# Patient Record
Sex: FEMALE | Employment: FULL TIME | URBAN - METROPOLITAN AREA
[De-identification: names, ages, dates, MRNs, and addresses within clinical notes are randomized per-mention and may not be internally consistent; named-entity substitution may affect disease eponyms.]

---

## 2024-08-29 ENCOUNTER — DOCUMENTATION (OUTPATIENT)
Dept: SURGERY | Facility: CLINIC | Age: 64
End: 2024-08-29

## 2024-08-29 NOTE — PROGRESS NOTES
Initial bariatric nurse interview completed. Discussed with patient program progression/expectations along with program requirements and necessary clearances. Explained to patient they must travel with support person to all visits. Went over initial evaluation visit: labwork, appointment with Dr. Torres, appointment with dietician, and appointment with psych. Explained patient will be in area for approximately 2 days for evaluation visit and 9 days surrounding WLS at a local hotel. Patient aware of the $6850 Total OOP maximum, $930 for Initial evaluation visit if OOP not met. Any amount owed must be paid within 10 business days of visits. Patient verbalized understanding to all. All questions answered. Scheduled initial visit for 9/23/2024. Emailed New Patient Handbook including assessment forms & contracts.

## 2024-09-03 ENCOUNTER — TELEPHONE (OUTPATIENT)
Dept: SURGERY | Facility: CLINIC | Age: 64
End: 2024-09-03
Payer: COMMERCIAL

## 2024-09-03 PROBLEM — Z98.84 BARIATRIC SURGERY STATUS: Status: ACTIVE | Noted: 2024-09-03

## 2024-09-03 PROBLEM — Z01.818 PREOPERATIVE CLEARANCE: Status: ACTIVE | Noted: 2024-09-03

## 2024-09-03 PROBLEM — E66.01 MORBID OBESITY (MULTI): Status: ACTIVE | Noted: 2024-09-03

## 2024-09-03 RX ORDER — OXYBUTYNIN CHLORIDE 15 MG/1
15 TABLET, EXTENDED RELEASE ORAL DAILY
COMMUNITY

## 2024-09-03 RX ORDER — FOLIC ACID 1 MG/1
1 TABLET ORAL DAILY
COMMUNITY

## 2024-09-03 RX ORDER — SERTRALINE HYDROCHLORIDE 100 MG/1
100 TABLET, FILM COATED ORAL DAILY
COMMUNITY

## 2024-09-03 RX ORDER — NAPROXEN 500 MG/1
500 TABLET ORAL
COMMUNITY

## 2024-09-03 RX ORDER — BUPROPION HYDROCHLORIDE 300 MG/1
300 TABLET ORAL DAILY
COMMUNITY

## 2024-09-03 RX ORDER — LISINOPRIL 10 MG/1
10 TABLET ORAL DAILY
COMMUNITY

## 2024-09-03 RX ORDER — NAPROXEN SODIUM 220 MG/1
1200 TABLET ORAL DAILY
COMMUNITY

## 2024-09-03 RX ORDER — CHOLECALCIFEROL (VITAMIN D3) 50 MCG
50 TABLET ORAL DAILY
COMMUNITY

## 2024-09-03 RX ORDER — TIZANIDINE HYDROCHLORIDE 2 MG/1
2 CAPSULE, GELATIN COATED ORAL AS NEEDED
COMMUNITY

## 2024-09-03 NOTE — TELEPHONE ENCOUNTER
Spoke with patient: reviewed intake forms, discussed upcoming appointment, and given nurse contact number for any further questions.

## 2024-09-03 NOTE — PROGRESS NOTES
Subjective   Date: 9/23/2024 Time: 9:03 AM  Name: Sarah Villasenor  MRN: [id number]    This is a 64 y.o. female with morbid obesity, BMI ~36 who presents to clinic for consideration of bariatric surgery. she has attempted and failed multiple diet and exercise regimens for weight loss. Initial Onset of obesity was in childhood.  Their goal for surgery is to  be healthier  and lose weight. The patient has tried multiple diets to lose weight including Rodriguez, Morris, Weight Watchers, Medications , and dietary drinks, low calorie . The patient was most successful with the low fat diet. The most pounds lost on this diet were 20 lbs. The patient considers their dietary weakness to be  carbs, sweets, eating out, late night snacking, pop/soda  The patient reports a  highest weight ever of 230 pounds and lowest weight ever of 200 poundsThe patient does not exercise    Here with her sister who had rygbp 20 years ago and she is doing well.  Patient notes she is sedentary and and broke arm in may.  Does enjoy sweets.  She has cut back on pop.  Has dessert 1-2 days/week  PREFERRED SURGICAL PROCEDURE: Laparoscopic Kali-en-Y Gastric Bypass: sister had RYGBP 10 years ago with great success    Comorbidities: anxiety, back paintakes NSAIDS, depressed moodtakes medications, sees a therapist/psychiatrist, and previously hospitalized for mental health, infertility, urinary incontinence, sleep apnea using an appliance, and hypertension controlled with oral meds  Back pain corrected with surgery and does not use a lot of nsaids, can walk a half mile and then gets back pain  Uses naproxyn daily, getsback pain without it  Menstrual History : Menopause age was UNK years old    PMH:   Past Medical History:   Diagnosis Date    Anoxic brain injury (Multi) 2012    Anxiety and depression     Back pain     Decreased exercise tolerance     Decreased range of motion     Hypertension     Infertility, female     JOSE on CPAP     Urinary incontinence      She overdosed on oxycontin in 2012 and had anoxic brain injury-notes accidental    PSH:   Past Surgical History:   Procedure Laterality Date    BACK SURGERY  10/09/2009    lL4, L5    CARPAL TUNNEL RELEASE Bilateral     COLONOSCOPY  2011    FRACTURE SURGERY  06/12/2024    Left arm fracture D/T fall    TONSILLECTOMY  11/05/1990        NONE:  personal/family hx of VTE.        GERD - Health Related Quality of Life Questionnaire (GERD- HRQL): Denies GERD    Off PPI for has never taken medications (how long)    How bad is the heartburn? 0 = No symptoms  Heartburn when lying down? 0 = No symptoms  Heartburn when standing up? 0 = No symptoms  Heartburn after meals? 0 = No symptoms  Does heartburn change your diet? 0 = No symptoms  Does heartburn wake you from sleep? 0 = No symptoms    Do you have difficulty swallowing? 0 = No symptoms  Do you have pain with swallowing? 0 = No symptoms  If you take medication, does this affect your daily life? 0 = No symptoms    How bad is the regurgitation? 0 = No symptoms  Regurgitation when lying down? 0 = No symptoms  Regurgitation when standing up? 0 = No symptoms  Regurgitation after meals? 0 = No symptoms  Does regurgitation change your diet? 0 = No symptoms  Does regurgitation wake you from sleep? 0 = No symptoms    How satisfied are you with your present condition? Neutral    Total score (calculated by summing the individual scores of questions 1-15): 0   Greatest possible score 75 (worst symptoms).   Lowest possible score 0 (no symptoms).    Heartburn score (calculated by summing the individual scores of questions 1-6): 0   Worst heartburn symptoms: 30.   No heartburn symptoms: 0.   Score less than or equal to 12 with each individual question not exceeding 2 indicate heartburn elimination.    Regurgitation score (calculated by summing the individual scores of questions 10-15): 0   Worst regurgitation symptoms: 30.   No regurgitation symptoms: 0.   Score less than or equal to 12  with each individual question not exceeding 2 indicate regurgitation elimination.               FAMILY HISTORY:  Family History   Problem Relation Name Age of Onset    Heart disease Father      Heart attack Father      Diabetes Father      Hypertension Father      Obesity in sister    SOCIAL HISTORY:  Social History     Tobacco Use    Smoking status: Never    Smokeless tobacco: Never   Substance Use Topics    Alcohol use: Not Currently    Drug use: Never       MEDICATIONS:  Prior to Admission Medications:  Medication Documentation Review Audit       Reviewed by Tory Torres MD MPH (Physician) on 09/23/24 at 0853      Medication Order Taking? Sig Documenting Provider Last Dose Status   buPROPion XL (Wellbutrin XL) 300 mg 24 hr tablet [id number]  Take 1 tablet (300 mg) by mouth once daily. Do not crush, chew, or split. Historical Provider, MD  Active   calcium phosphate trib/vit D3 (CALTRATE GUMMY BITES ORAL) [id number]  Take 650 mg by mouth once daily. Historical Provider, MD  Active   cholecalciferol (Vitamin D3) 50 MCG (2000 UT) tablet [id number]  Take 1 tablet (50 mcg) by mouth once daily. Historical Provider, MD  Active   folic acid (Folvite) 1 mg tablet [id number]  Take 1 tablet (1 mg) by mouth once daily. Historical Provider, MD  Active   glucosamine HCl (GLUCOSAMINE, BULK, MISC) [id number]  1,100 mg once daily. Historical Provider, MD  Active   lisinopril 10 mg tablet [id number]  Take 1 tablet (10 mg) by mouth once daily. Historical Provider, MD  Active   naproxen (Naprosyn) 500 mg tablet [id number]  Take 1 tablet (500 mg) by mouth 2 times daily (morning and late afternoon). Takes 1-2 times daily as needed Historical Provider, MD  Active   omega 3-dha-epa-fish oil (Fish OiL) 1,200 (144-216) mg capsule [id number]  Take 1 capsule (1,200 mg) by mouth once daily. Historical Provider, MD  Active   oxybutynin XL (Ditropan-XL) 15 mg 24 hr tablet [id number]  Take 1 tablet (15 mg) by mouth once daily. Do not crush, chew, or  "split. Historical Provider, MD  Active   sertraline (Zoloft) 100 mg tablet [id number]  Take 1 tablet (100 mg) by mouth once daily. Historical Provider, MD  Active   tiZANidine (Zanaflex) 2 mg capsule [id number]  Take 1 capsule (2 mg) by mouth if needed for muscle spasms. Historical Provider, MD  Active                     ALLERGIES:  Allergies   Allergen Reactions    Erythromycin Unknown   Hives with erythromycin    REVIEW OF SYSTEMS:  GENERAL: Negative for malaise, significant weight loss and fever  HEAD: Negative for headache, swelling.  NECK: Negative for lumps, goiter, pain and significant neck swelling  RESPIRATORY: Negative for cough, wheezing or shortness of breath.  CARDIOVASCULAR: Negative for chest pain, leg swelling or palpitations.  GI: Negative for abdominal discomfort, blood in stools or black stools or change in bowel habits  : No history of dysuria, frequency or incontinence  MUSCULOSKELETAL: Negative for joint pain or swelling, back pain or muscle pain.  SKIN: Negative for lesions, rash, and itching.  PSYCH: Negative for sleep disturbance, mood disorder and recent psychosocial stressors.  ENDOCRINE: Negative for cold or heat intolerance, polyuria, polydipsia and goiter.    Objective   PHYSICAL EXAM:  Visit Vitals  /80   Pulse 75   Ht 1.575 m (5' 2\")   Wt 98.4 kg (217 lb)   BMI 39.69 kg/m²   Smoking Status Never   BSA 2.07 m²   Body mass index is 39.69 kg/m².   General appearance: obese, NAD, Seems slightly cognitively slow, walks with a cane and notes has issues with balance  Neuro: AOx3  Head: EOMI; no swelling or lesions of scalp or face  ENT:  no lumps or lymphadenopathy, thyroid normal to palpation; oropharynx clear, no swelling or erythema  Skin: warm, no erythema or rashes  Lungs: clear to percussion and auscultation  Heart: regular rhythm and S1, S2 normal  Abdomen: soft, non-tender, no masses, no organomegaly  Extremities: Normal exam of the extremities. No swelling or pain.  Psych: no " hurried speech, no flight of ideas, normal affect    IMPRESSION:  Sarah Villasenor is a 64 y.o. female with a bmi of Body mass index is 39.69 kg/m². with the following diagnoses and co-morbidities: anxiety, back paintakes NSAIDS, depressed moodtakes medications, sees a therapist/psychiatrist, and previously hospitalized for mental health, infertility, urinary incontinence, sleep apnea using an appliance, and hypertension controlled with oral meds.  We discussed several factors comparing bypass versus sleeve. She feels she does not want sleeve to go bad. I reviewed with her the ability to keep the nsaid's on board will be better with a sleeve.  She also is over 61 yo and I feel for her and her slightly slower cognitive state due to her anoxic brain injury.  I also discussed lifestyle changes to make this successful and her movement is limited by her back pain.  She is still insistent with the bypass despite these suggestions I have made to her.  I have emphasized the improtance of making lifestyle changes in preparation for surgery and improving her movement to at least 5K steps prior to surgery.hip pain, urinary incontinence, depression, htn and all can be improved with weight loss.       We discussed the sleeve and b ypass  at [facility] and all questions were answered. risks and benefits were discussed  The patient understands the risks and benefits of the procedure and how the procedure is performed. The patient understands the risks include but are not limited to bleeding, infection, DVT, PE, pneumonia, myocardial infarction, leak along the staple lines, and weight regain. We discussed lifestyle changes necessary to be successful.      Her risk of complication is 1-2%.   She notes she is falling and has a balance problem.  She notes she can do chair exercises, I suggested a stationary or recumbent bike    This patient does meet the criteria for a surgical weight loss procedure according to NIH guidelines.  The risks of  sleeve gastrectomy, Kali-en-Y gastric bypass, and duodenal switch surgery including bleeding, leak, wound infection, dehydration, ulcers, internal hernia, DVT/PE, prolonged nausea/vomiting, incomplete resolution of associated medical conditions, reflux, weight regain, vitamin/mineral deficiencies, and death have been explained to the patient and Sarah Villasenor has expressed understanding and acceptance of them.     The increased risk of substance and alcohol abuse following bariatric surgery was discussed with the patient, along with the negative consequences of substance/alcohol use after surgery including addiction, worsening of mental health disorders, and injury to the stomach. The risk of smoking and vaping (tobacco or any other substance) after bariatric surgery was explained to the patient. This includes risk of anastamotic ulcers, gastritis, bleeding, perforation, stricture, and PO intolerance.  The patient expressed understanding and acceptance of these risks.    The benefits of the above surgeries including weight loss, improvement/resolution of associated medical and mental health conditions, improved mobility, and decreased mortality have been explained the the patient and Sarah Villasenor has expressed understanding and acceptance of them.      Assessment/Plan   PLAN:  The plan of treatment for Sarah Villaseonr is to continue with the consultations and tests ordered today in hopes of qualifying for pre-operative clearance for bariatric surgery. This includes:  Increasing movement and get 1 hour of exercise/day  No sweets except once/week.  No rice, breads or pasta  Eat 3 meals with a protein and veggie daily  Consult Nutrition for education   Consult Psychology  Consult Cardiology  Consult Sleep Medicine - concern for JOSE - Currently using a CPAP  Labs completed today  Esophagram  Recommend at least 10 lbs of weight loss prior to surgery.  Additional consults/testing: none    PLANNED PROCEDURE: Laparoscopic  Laparoscopic Sleeve Gastrectomy and Laparoscopic Kali en y Gastric Bypass      The following are some lifestyle changes you should begin to prepare you for your bypass surgery.   Eliminate soda and other carbonated beverages from your diet. Carbonation will not be well tolerated after surgery. Try Propel, Vitamin Water Zero, Sobe Lifewater, Crystal Light or water.    Increase fluid consumption to 64 oz daily. Do not drink within 30 minutes of eating as this will liquefy your food and make you hungry more quickly.    Exercise for 30-60 minutes daily. Brisk walking, bike riding and swimming are all examples of healthy exercise. If you are unable to exercise we recommend seated exercise.    Do not skip meals.    Take a multivitamin daily.    Lose weight. In preparation for your surgery it is important that you begin making healthier food choices now. Our dietitian will meet with you to help you select foods lower in calories and higher in nutrition. We would like you to lose at least 10  lbs prior to surgery.     Increase your protein intake to 60 grams per day.    Alcohol is empty calories. Please eliminate while preparing for surgery.    Plan your meals.      General Instruction: 1) Use the information we gave you today to work through your insurance requirements and medical clearances.   2) These documents need to get faxed to the program navigators so they can submit them for approval from your insurance company.   3) Obtain labs today at a  facility. We will call you with any abnormalities and corrections you need to make.   4) Continue to work with your primary care doctor and other specialist so your other health problems are well controlled prior to your surgery.   5) Adopt the recommendations of the program dietician so you develop healthy eating patterns.   6) Work with the sleep team to get your sleep apnea treated to prevent other health problems .   7) Consider attending a support group to  learn from other who have been through the process.   8) Come to the [facility] sessions.   45 minutes were spent with patient including history, physical exam, and education.

## 2024-09-23 ENCOUNTER — LAB (OUTPATIENT)
Dept: LAB | Facility: LAB | Age: 64
End: 2024-09-23
Payer: COMMERCIAL

## 2024-09-23 ENCOUNTER — APPOINTMENT (OUTPATIENT)
Dept: SURGERY | Facility: CLINIC | Age: 64
End: 2024-09-23
Payer: COMMERCIAL

## 2024-09-23 ENCOUNTER — OFFICE VISIT (OUTPATIENT)
Dept: BEHAVIORAL HEALTH | Facility: HOSPITAL | Age: 64
End: 2024-09-23
Payer: COMMERCIAL

## 2024-09-23 ENCOUNTER — NUTRITION (OUTPATIENT)
Dept: SURGERY | Facility: HOSPITAL | Age: 64
End: 2024-09-23
Payer: COMMERCIAL

## 2024-09-23 VITALS
SYSTOLIC BLOOD PRESSURE: 143 MMHG | DIASTOLIC BLOOD PRESSURE: 80 MMHG | BODY MASS INDEX: 39.93 KG/M2 | HEART RATE: 75 BPM | HEIGHT: 62 IN | WEIGHT: 217 LBS

## 2024-09-23 DIAGNOSIS — I10 HYPERTENSION, UNSPECIFIED TYPE: ICD-10-CM

## 2024-09-23 DIAGNOSIS — E66.01 MORBID OBESITY (MULTI): ICD-10-CM

## 2024-09-23 DIAGNOSIS — F33.0 MILD EPISODE OF RECURRENT MAJOR DEPRESSIVE DISORDER (CMS-HCC): ICD-10-CM

## 2024-09-23 DIAGNOSIS — Z98.84 BARIATRIC SURGERY STATUS: Primary | ICD-10-CM

## 2024-09-23 DIAGNOSIS — Z01.818 PREOPERATIVE CLEARANCE: ICD-10-CM

## 2024-09-23 DIAGNOSIS — Z98.84 BARIATRIC SURGERY STATUS: ICD-10-CM

## 2024-09-23 LAB
25(OH)D3 SERPL-MCNC: 39 NG/ML (ref 30–100)
ALBUMIN SERPL BCP-MCNC: 4.2 G/DL (ref 3.4–5)
ALP SERPL-CCNC: 69 U/L (ref 33–136)
ALT SERPL W P-5'-P-CCNC: 10 U/L (ref 7–45)
AMPHETAMINES UR QL SCN: NORMAL
ANION GAP SERPL CALC-SCNC: 11 MMOL/L (ref 10–20)
APTT PPP: 32 SECONDS (ref 27–38)
AST SERPL W P-5'-P-CCNC: 22 U/L (ref 9–39)
BARBITURATES UR QL SCN: NORMAL
BASOPHILS # BLD AUTO: 0.05 X10*3/UL (ref 0–0.1)
BASOPHILS NFR BLD AUTO: 0.7 %
BENZODIAZ UR QL SCN: NORMAL
BILIRUB SERPL-MCNC: 0.7 MG/DL (ref 0–1.2)
BUN SERPL-MCNC: 22 MG/DL (ref 6–23)
BZE UR QL SCN: NORMAL
CALCIUM SERPL-MCNC: 9.2 MG/DL (ref 8.6–10.3)
CANNABINOIDS UR QL SCN: NORMAL
CHLORIDE SERPL-SCNC: 100 MMOL/L (ref 98–107)
CHOLEST SERPL-MCNC: 236 MG/DL (ref 0–199)
CHOLESTEROL/HDL RATIO: 4.6
CO2 SERPL-SCNC: 31 MMOL/L (ref 21–32)
CREAT SERPL-MCNC: 0.77 MG/DL (ref 0.5–1.05)
EGFRCR SERPLBLD CKD-EPI 2021: 87 ML/MIN/1.73M*2
EOSINOPHIL # BLD AUTO: 0.18 X10*3/UL (ref 0–0.7)
EOSINOPHIL NFR BLD AUTO: 2.6 %
ERYTHROCYTE [DISTWIDTH] IN BLOOD BY AUTOMATED COUNT: 13.8 % (ref 11.5–14.5)
EST. AVERAGE GLUCOSE BLD GHB EST-MCNC: 114 MG/DL
FENTANYL+NORFENTANYL UR QL SCN: NORMAL
FERRITIN SERPL-MCNC: 33 NG/ML (ref 8–150)
FOLATE SERPL-MCNC: >24 NG/ML
GLUCOSE SERPL-MCNC: 103 MG/DL (ref 74–99)
HBA1C MFR BLD: 5.6 %
HCT VFR BLD AUTO: 45.9 % (ref 36–46)
HDLC SERPL-MCNC: 50.8 MG/DL
HGB BLD-MCNC: 14.2 G/DL (ref 12–16)
IMM GRANULOCYTES # BLD AUTO: 0.03 X10*3/UL (ref 0–0.7)
IMM GRANULOCYTES NFR BLD AUTO: 0.4 % (ref 0–0.9)
INR PPP: 1 (ref 0.9–1.1)
IRON SATN MFR SERPL: 33 % (ref 25–45)
IRON SERPL-MCNC: 116 UG/DL (ref 35–150)
LDLC SERPL CALC-MCNC: 164 MG/DL
LYMPHOCYTES # BLD AUTO: 1.55 X10*3/UL (ref 1.2–4.8)
LYMPHOCYTES NFR BLD AUTO: 22.8 %
MCH RBC QN AUTO: 28.1 PG (ref 26–34)
MCHC RBC AUTO-ENTMCNC: 30.9 G/DL (ref 32–36)
MCV RBC AUTO: 91 FL (ref 80–100)
METHADONE UR QL SCN: NORMAL
MONOCYTES # BLD AUTO: 0.38 X10*3/UL (ref 0.1–1)
MONOCYTES NFR BLD AUTO: 5.6 %
NEUTROPHILS # BLD AUTO: 4.62 X10*3/UL (ref 1.2–7.7)
NEUTROPHILS NFR BLD AUTO: 67.9 %
NON HDL CHOLESTEROL: 185 MG/DL (ref 0–149)
NRBC BLD-RTO: 0 /100 WBCS (ref 0–0)
OPIATES UR QL SCN: NORMAL
OXYCODONE+OXYMORPHONE UR QL SCN: NORMAL
PCP UR QL SCN: NORMAL
PLATELET # BLD AUTO: 265 X10*3/UL (ref 150–450)
POTASSIUM SERPL-SCNC: 4.4 MMOL/L (ref 3.5–5.3)
PROT SERPL-MCNC: 6.9 G/DL (ref 6.4–8.2)
PROTHROMBIN TIME: 11.8 SECONDS (ref 9.8–12.8)
PTH-INTACT SERPL-MCNC: 62.9 PG/ML (ref 18.5–88)
RBC # BLD AUTO: 5.06 X10*6/UL (ref 4–5.2)
SODIUM SERPL-SCNC: 138 MMOL/L (ref 136–145)
T4 FREE SERPL-MCNC: 0.98 NG/DL (ref 0.61–1.12)
TIBC SERPL-MCNC: 356 UG/DL (ref 240–445)
TRIGL SERPL-MCNC: 104 MG/DL (ref 0–149)
TSH SERPL-ACNC: 1.35 MIU/L (ref 0.44–3.98)
UIBC SERPL-MCNC: 240 UG/DL (ref 110–370)
VIT B12 SERPL-MCNC: 338 PG/ML (ref 211–911)
VLDL: 21 MG/DL (ref 0–40)
WBC # BLD AUTO: 6.8 X10*3/UL (ref 4.4–11.3)

## 2024-09-23 PROCEDURE — 82306 VITAMIN D 25 HYDROXY: CPT

## 2024-09-23 PROCEDURE — 84425 ASSAY OF VITAMIN B-1: CPT

## 2024-09-23 PROCEDURE — 96130 PSYCL TST EVAL PHYS/QHP 1ST: CPT | Mod: AH | Performed by: PSYCHOLOGIST

## 2024-09-23 PROCEDURE — 3008F BODY MASS INDEX DOCD: CPT | Performed by: SURGERY

## 2024-09-23 PROCEDURE — 84443 ASSAY THYROID STIM HORMONE: CPT

## 2024-09-23 PROCEDURE — 80307 DRUG TEST PRSMV CHEM ANLYZR: CPT

## 2024-09-23 PROCEDURE — 82746 ASSAY OF FOLIC ACID SERUM: CPT

## 2024-09-23 PROCEDURE — 82728 ASSAY OF FERRITIN: CPT

## 2024-09-23 PROCEDURE — 82525 ASSAY OF COPPER: CPT

## 2024-09-23 PROCEDURE — 80053 COMPREHEN METABOLIC PANEL: CPT

## 2024-09-23 PROCEDURE — 84630 ASSAY OF ZINC: CPT

## 2024-09-23 PROCEDURE — 85730 THROMBOPLASTIN TIME PARTIAL: CPT

## 2024-09-23 PROCEDURE — 96136 PSYCL/NRPSYC TST PHY/QHP 1ST: CPT | Mod: AH | Performed by: PSYCHOLOGIST

## 2024-09-23 PROCEDURE — 83550 IRON BINDING TEST: CPT

## 2024-09-23 PROCEDURE — 99205 OFFICE O/P NEW HI 60 MIN: CPT | Performed by: SURGERY

## 2024-09-23 PROCEDURE — 90791 PSYCH DIAGNOSTIC EVALUATION: CPT | Mod: AH | Performed by: PSYCHOLOGIST

## 2024-09-23 PROCEDURE — 80061 LIPID PANEL: CPT

## 2024-09-23 PROCEDURE — 85610 PROTHROMBIN TIME: CPT

## 2024-09-23 PROCEDURE — 84439 ASSAY OF FREE THYROXINE: CPT

## 2024-09-23 PROCEDURE — 80323 ALKALOIDS NOS: CPT

## 2024-09-23 PROCEDURE — 83540 ASSAY OF IRON: CPT

## 2024-09-23 PROCEDURE — 85025 COMPLETE CBC W/AUTO DIFF WBC: CPT

## 2024-09-23 PROCEDURE — 3077F SYST BP >= 140 MM HG: CPT | Performed by: SURGERY

## 2024-09-23 PROCEDURE — 3079F DIAST BP 80-89 MM HG: CPT | Performed by: SURGERY

## 2024-09-23 PROCEDURE — 36415 COLL VENOUS BLD VENIPUNCTURE: CPT

## 2024-09-23 PROCEDURE — 90791 PSYCH DIAGNOSTIC EVALUATION: CPT | Performed by: PSYCHOLOGIST

## 2024-09-23 PROCEDURE — 96136 PSYCL/NRPSYC TST PHY/QHP 1ST: CPT | Performed by: PSYCHOLOGIST

## 2024-09-23 PROCEDURE — 83013 H PYLORI (C-13) BREATH: CPT

## 2024-09-23 PROCEDURE — 83970 ASSAY OF PARATHORMONE: CPT

## 2024-09-23 PROCEDURE — 82607 VITAMIN B-12: CPT

## 2024-09-23 PROCEDURE — 83036 HEMOGLOBIN GLYCOSYLATED A1C: CPT

## 2024-09-23 PROCEDURE — 96130 PSYCL TST EVAL PHYS/QHP 1ST: CPT | Performed by: PSYCHOLOGIST

## 2024-09-23 ASSESSMENT — LIFESTYLE VARIABLES
HOW OFTEN DO YOU HAVE A DRINK CONTAINING ALCOHOL: MONTHLY OR LESS
AUDIT-C TOTAL SCORE: 1
HOW MANY STANDARD DRINKS CONTAINING ALCOHOL DO YOU HAVE ON A TYPICAL DAY: 1 OR 2
AUDIT-C TOTAL SCORE: 1
HOW OFTEN DO YOU HAVE SIX OR MORE DRINKS ON ONE OCCASION: NEVER
SKIP TO QUESTIONS 9-10: 1

## 2024-09-23 ASSESSMENT — PATIENT HEALTH QUESTIONNAIRE - PHQ9
1. LITTLE INTEREST OR PLEASURE IN DOING THINGS: NOT AT ALL
SUM OF ALL RESPONSES TO PHQ9 QUESTIONS 1 & 2: 0
2. FEELING DOWN, DEPRESSED OR HOPELESS: NOT AT ALL

## 2024-09-23 ASSESSMENT — ANXIETY QUESTIONNAIRES
3. WORRYING TOO MUCH ABOUT DIFFERENT THINGS: SEVERAL DAYS
1. FEELING NERVOUS, ANXIOUS, OR ON EDGE: NOT AT ALL
4. TROUBLE RELAXING: NOT AT ALL
5. BEING SO RESTLESS THAT IT IS HARD TO SIT STILL: NOT AT ALL
GAD7 TOTAL SCORE: 2
6. BECOMING EASILY ANNOYED OR IRRITABLE: SEVERAL DAYS
7. FEELING AFRAID AS IF SOMETHING AWFUL MIGHT HAPPEN: NOT AT ALL
2. NOT BEING ABLE TO STOP OR CONTROL WORRYING: NOT AT ALL

## 2024-09-23 NOTE — PROGRESS NOTES
"  Time started: 940  Time ended: 1045  Total time spent: 65 minutes   Visit type: in person   Other time spent: 30 minutes report writing (integrating data, scoring and interpreting psych assessments, and plan for clearance).     Disclaimer: We discussed that the note will be visible and others healthcare practitioners will have access. The patient has consented to an unrestricted note.     Metabolic Bariatric Surgery: Behavioral Health Evaluation:   Referral: Bariatric Surgery Department, [facility]  Chief Complaint: Psychiatric Evaluation (Metabolic bariatric surgery evaluation)   Sister: Monique was in present for this evaluation     Brief Background:   Sarah Villasenor is a  64 y.o. year-old , White or  female. The patient was born in [place] and raised by her mother.  The patient has 1 son.  The patient has 3 livings siblings and 1 . She lives in a home with her  and adult son and feels safe.   Employment: [employer] as a [employer]   Education: Associate's degree in Administrative Medical Assistant    Weight history:  The patient started having problems with excess weight when She was 7 years old  Highest adult weight (non-pregnant): 230 lbs  Lowest adult weight: 195 lbs  Current weight: 217 lbs  Height: 5'2\"  Diets tried: OTC weight loss medications, weight watchers, and low calorie   Patient had the most success with low calorie, losing 10 pounds.     Surgeon, procedure and risks/benefits from a behavioral health perspective:   Dr. Torres is the patient's surgeon. Patient prefers the gastric bypass procedure.  The medical risks and benefits were discussed with the patient's surgeon. Risks/benefits of the surgery from a behavioral health perspective were reviewed.    Motivation and weight loss goal:   Currently, how does your excess weight affect your life? It makes her feel depressed. \"I can't buy clothes off the racks sometimes. \"I can't walk too far or too fast. I fall a lot.\" And " "HTN.   The patient's motivation for surgery includes: \"to improve her sexual health and to walk her dogs longer and to reduce the amount of medication.\"   The patient expects to lose 75 pounds 12-18 months postsurgery.     Support system:   Sarah Villasenor's  and sister will be the patient's support system after the surgery.   Family/friends supportive: yes  Family/friends have concerns that need to be addressed: no    Stress and Coping:   Psychosocial stressors: , work-related,   Coping mechanisms include: talk to her  and talks to her sister.   Coping skills were rated as effective.    The timing for surgery:   The patient can take time off work: yes  Any reasons to delay the surgery? Possibly will have to go back to work for a month before she can have this surgery.     Genetic and medical conditions and/or medications contributing to excess weight:  Genetics: yes, one sibling.   Medications: no  Medical conditions: yes, back pain and back surgery and being sedentary   Family history of obesity-related or other medical conditions: CVD (father) depression (father), mother (CHF and osteoporosis and scoliosis)   Mother passed away in 2020 from dementia, father in 2009 from completed suicide,\" and brother in 2015 from \"blood cancer.\"  Patient's obesity-related and other medical conditions: incontinence, depression, low back pain, HTN  Other Factors that led to weight gain or weight regain include: \"eating the wrong kinds of foods, like too many carbs and drinking Pepsi and Coke.\"     Specifically for women:   Hormonal:   Weight changes after pregnancies: Yes  Infertility treatments: yes   If yes, any weight changes? No  Any noticeable changes in weight or weight distribution during iliana-or post-menopause: Yes.    Behavioral and/or eating disorders contributing to excess weight:   The patient eats more than intended or planned in response to: Boredom and Happiness  Food weaknesses include: " sugar    Disordered eating History:   The patient denied a history of an eating disorder.     Current eating disorder assessment:  Compensatory behaviors: The patient reported denied compensatory behaviors    Binge Eating Disorder symptoms: Does not meet criteria  Night Eating Syndrome: DCnighteatingsyndrome: The patient does not meet criteria for night eating syndrome    Graze Eating Habits: The patient reported DCgrazeeating: does not meet criteria for problematic graze eating habits  Sleep-Disordered eating: no  The patient reported the following problems with body image: DCbodyimage: over preoccupation with weight and over preoccupation with appearance    The patient does not meet criteria for an eating disorder.     Adherence:  The patient is  working with a  registered dietician in the Bariatric Surgery and Weight Loss Program.   The patient's pre-surgery weight loss goal is 10 lbs      Current exercise habits: She is doing arm exercises to help recover from her shoulder surgery.      Current eating habits:  The patient consumes 2 meals, 1-2 snacks, and consumes 24 ounces of water per day.   The patient has made the following behavioral changes since starting the , surgical weight loss program. She stopped drinking pop and started drinking water and/or crystal light packages.     The patient has met with sleep medicine.   The patient has been prescribed a CPAP machine.  The patient usesthe CPAP machine as prescribed.   The patient takes her medications as prescribed   Are there any barriers to exercise or changing your eating habits? yes    Mental health history:   Currently, the patient is being treated for depression via medications.   The patient has a history of Depression   Learning disorders denied    The patient denied a history of:  Bipolar Disorder, Anxiety Disorder, Panic Attacks/Disorder, Social Anxiety, Obsessive Compulsive Disorder, Disorder of Thought , Personality Disorder, PTSD, and  ADD  Hospitalizations for mental health: Yes, depression, 2/8/24 to 2/14/24.   Suicide attempts: Yes, last time was several years ago. She was hospitalized in 2024 for a plan but not for an attempt.   Self-injurious behaviors: Denied  Insomnia: Denied since she has been taking sleep aides.   History of problems with impulse control: Denied  Cognitive (memory problems and/or forgetfulness): Yes and it impacts her day-to-day functioning. She has had memory testing.  Any mental health problems related to past surgeries? Denied    Substance, tobacco, and alcohol use history:    History of alcohol dependence: Denied  Substance dependence: Denied  Education was provided about alcohol and substance use and postsurgery: yes  Dependence on prescription medications: Denied  OTC Pain medications include: naproxen  Education was provided about using only acetaminophen: yes  Tobacco dependence: Denied  Currently, she is not using tobacco products.   Treatment programs for addiction: N/A    Please see AUDIT for alcohol use habits over the past year.   Current Tobacco use habits include:    Tobacco Use: Low Risk  (9/3/2024)    Patient History     Smoking Tobacco Use: Never     Smokeless Tobacco Use: Never     Passive Exposure: Not on file        Current Cannabis use:   CBD products, such as lotions and oils: Denied  Cannabis edibles: Denied  medical marijuana: Denied  Cannabis from dispensaries: Denied   Street weed:  Denied  THC vape pen: Denied  Hookah with nicotine or marijuana leaves: Denied  Last time used cannabis in any form? never    Other illegal or illicit drug use? Denied   Last time used other illegal or illicit drugs? Never     Mental Status Evaluation  General Appearance: well groomed, appropriate eye contact  Attitude/Behavior: cooperative  Motor: no psychomotor agitation or retardation, no tremor or other abnormal movements  Speech: normal rate, volume, prosody  Gait/Station: WFL  Mood: anxious   Affect: anxious,  "depressed about not being cleared today.   Thought Process: linear, goal directed  Thought Associations: no loosening of associations  Thought Content: normal  Perception: no perceptual abnormalities noted  Sensorium: alert and oriented to person, place, time and situation  Insight: intact  Judgment: intact  Cognition: cognitively intact to conversational testing with respect to attention, orientation, fund of knowledge, recent and remote memory, and language     The patient's mood today was described as \"good.\"  During today's evaluation, the patient deniedsuicidal ideation, plan, and/or attempt.      Summary:   Sarah Villasenor   is a  64 y.o. year-old  , , White or  female who presents today with a history of obesity with comorbid medical conditions and difficulty with weight loss. The purpose of this evaluation was to conduct a behavioral health evaluation for metabolic bariatric surgery to determine if She is an appropriate candidate for weight loss surgery from a behavioral health perspective.      Eating Habits Checklist = 6. This score falls in the range of minimal binge eating habits. Notable responses include: \"Sometimes when I eat a “forbidden food” on a diet, I feel like I “blew it” and eat even more.\"  Alcohol Use Identification Test-C (AUDIT-C) = 1. She consumes alcohol monthly or less and consumes 1-2 drinks on her typical day of drinking. This does not meet criteria for hazardous use of alcohol or alcohol dependence.   Generalized anxiety disorder questionnaire-7 (NATHALY-7) = 2. This is a negative screen for anxiety.   Patient Health Questionnaire -9 (PHQ-9) = 0. This is a negative screen for depression. Patient's symptoms may be controlled via med management.     Clinical Summary:   Adherence Problems:  Sarah has made dietary changes. She is aware that she needs to increase her water intake from 24 ounces to 64 per day.   Motivation: She appears motivated to make changes to be successful " postsurgery.   Coping Skills: were rated as effective.   Resources for support: her  and her sister  Psychological Stability or Contradictions: hospitalization for depression and suicide plan, 02/2024.   Using illicit or illegal substances: Denied  Using tobacco/nicotine:  Denied  Problems with alcohol use based on AUDIT score: no  Seeing a practitioner to treat mental health or substance use disorders: Yes  Cleared for Surgery: no due to mental health hospitalization and plan to commit suicide in Feb. She is aware that she has to show stability for one year and that we will reassess Feb 2025.     Behavioral Health plan for clearance for metabolic bariatric surgery:   Patient to have prescriber of psychiatric medications complete the Psychiatric Support Letter and Referral to Psychologist or other Mental Health Provider to help with depression. Recommended to patient to ask her psychiatrist to help her to find a psychologist in her area, in [place], to help her to manage her depression.       Post-Surgery Recommendations: 1:1 follow up with Psychology at 1, 3, 6 and 12 months Postsurgery. Postsurgery education and support groups in her area in [place].         Nathalie Rai, PhD

## 2024-09-23 NOTE — PATIENT INSTRUCTIONS
PLAN:  The plan of treatment for Sarah Villasenor is to continue with the consultations and tests ordered today in hopes of qualifying for pre-operative clearance for bariatric surgery. This includes:  Increasing movement and get 1 hour of exercise/day  No sweets except once/week.  No rice, breads or pasta  Eat 3 meals with a protein and veggie daily  Consult Nutrition for education   Consult Psychology  Consult Cardiology  Consult Sleep Medicine - concern for JOSE - Currently using a CPAP  Labs completed today  Esophagram  Recommend at least 10 lbs of weight loss prior to surgery.  Additional consults/testing: none    PLANNED PROCEDURE: Laparoscopic Laparoscopic Sleeve Gastrectomy and Laparoscopic Kali en y Gastric Bypass      The following are some lifestyle changes you should begin to prepare you for your bypass surgery.   Eliminate soda and other carbonated beverages from your diet. Carbonation will not be well tolerated after surgery. Try Propel, Vitamin Water Zero, Sobe Lifewater, Crystal Light or water.    Increase fluid consumption to 64 oz daily. Do not drink within 30 minutes of eating as this will liquefy your food and make you hungry more quickly.    Exercise for 30-60 minutes daily. Brisk walking, bike riding and swimming are all examples of healthy exercise. If you are unable to exercise we recommend seated exercise.    Do not skip meals.    Take a multivitamin daily.    Lose weight. In preparation for your surgery it is important that you begin making healthier food choices now. Our dietitian will meet with you to help you select foods lower in calories and higher in nutrition. We would like you to lose at least 10  lbs prior to surgery.     Increase your protein intake to 60 grams per day.    Alcohol is empty calories. Please eliminate while preparing for surgery.    Plan your meals.      General Instruction: 1) Use the information we gave you today to work through your insurance requirements and medical  clearances.   2) These documents need to get faxed to the program navigators so they can submit them for approval from your insurance company.   3) Obtain labs today at a  facility. We will call you with any abnormalities and corrections you need to make.   4) Continue to work with your primary care doctor and other specialist so your other health problems are well controlled prior to your surgery.   5) Adopt the recommendations of the program dietician so you develop healthy eating patterns.   6) Work with the sleep team to get your sleep apnea treated to prevent other health problems .   7) Consider attending a support group to learn from other who have been through the process.   8) Come to the [facility] sessions.

## 2024-09-23 NOTE — PROGRESS NOTES
Surgeon: Brian  Patient is considering:  Gastric Bypass    ASSESSMENT:  Current weight:  217 lbs   Ht: 62  in   BMI: 39.69       Initial start weight: 217 lbs  Pre-Op Excess Body Weight (EBW):   81 lbs  Target Post-Op weight goal:  152 - 168 lbs    Food allergies/intolerances:  NKFA  Chewing/Swallowing/Dentition:  missing x3 teeth  Nausea / Vomiting / Hx Gastroparesis:  none  Diarrhea/ Constipation: Constipation 2x/mo  Exercise level: Physical Therapy 40 minutes 2 times/week   Smoking/Tobacco use: denies  Vitamins/Minerals supplements:  fish oil, calcium, glucosamine chondroiton  Medications:   see list  Past diet attempts:   Rodriguez, [place], Weight Watchers, Medications , and dietary drinks, low calorie   Hours of sleep/night: 7    24 HOUR RECALL/DIET HISTORY:  Breakfast:  nothing  Snack:    Lunch: [employer] hamburger, medium coke  Snack:   Dinner: BLT sandwich, chips, and pickle spear  Snack: none  Beverages: 32oz water, decaf tea/coffee - once in awhile , orange or crangrape 12oz juice, chocolate milk - sometimes   Alcohol: denies    Person responsible for cooking & shopping? Self and    Grocery stores frequented: [employer]   Grocery trips per week/month: 1/month  Food Insecurity: Mild   How often do you eat sweet snacks?  10x/week  How often do you eat savory snacks?  2x/week  How often do you eat out?  1/week  Do you feel overly stuffed?  No  Binge Eating? No  Night Eating?  No  Emotional Eating? Triggered by depression and will eat Little Debbies           READINESS TO LEARN:  Motivation to learn: Interested        Understanding of instruction: Good  Anticipated Compliance: Good      Family Support: , sister joined her session    Educational Materials Provided:    Plate Method  High Protein Snack List  High Protein Drink  High Protein food list  Schedules for support group   Goals sheet    Patient will scheduled to attend a Virtual Education Class to review the 2 week Pre-op diet, Post op  fluid, protein, vitamin/mineral supplementation, exercise goals and post op diet progression.    Patient presents with excessive calorie obesity seeking  gastric bypass.    Patient seen today to complete nutrition evaluation for weight loss surgery. Pt is interested in surgery to achieve a healthier weight and manage her DM. Patient states she often skips breakfast meal and often snacks in the afternoon before dinner, sometimes does not want to eat dinner. Patient reports stopping pop, and is drinking water and CL for main fluid source. Patient is currently in PT to recover from rotator surgery.     Discussed importance of making dietary an lifestyle changes to prepare for postop lifestyle  Patient is willing to add HB egg and toast for breakfast, she will also try Fairlife Protein drinks , to help reduce skipping breakfast. Patient wants to swap afternoon snacks with healthier alternatives to reduce sugar snack intake. Reviewed fluids to eliminate and replace with SF, non-carbonated, caffeine free fluids. Reviewed postop behaviors and encouraged to begin practicing. Recommended to start daily MVI. Patient is thinking she can start walking 1-2 days/week for 1/2 mile.     Patient was receptive to nutritional recommendations, asked numerous questions, and verbalized understanding of the weight loss surgery diet.  Patient expressed understanding about the importance of strict dietary compliance post-surgery to avoid nutritional deficiencies and achieve optimal weight loss and verbalized intent to follow dietary recommendations.      Malnutrition Screening:  Significant unintentional weight loss? No  Eating less than 75% of usual intake for more than 2 weeks? No      Nutrition Diagnosis:   1. Overweight/obesity related to excess energy intake as evidenced by BMI = 40 kg/m^2.  2. Food- and nutrition-related knowledge deficit related to lack of prior exposure to surgical weight loss information as evidenced by pt new to  surgical program.    Nutrition Interventions:   1. Modify type and amount of food and nutrients within meals and snacks.  2. Comprehensive Nutrition Education    Recommendations:  1. Structure meal patterns, eating three meals and 1-2 snacks per day.  2. Have either a protein drink or a hard boiled egg with 1 slice wheat toast at breakfast.  3. Eliminate juice and other high calorie beverages. Drink 64 ounces  (4 16oz water bottles) of water and crystal light daily.   4. Practice taking small sips of fluids, avoid chugging/gulping beverages.  5. Practice no drinking during meals.  6. Begin daily multivitamin.  No gummies. Centrum adult complete, equate kids chewable, or Women's One a Day are all acceptable options.  7. Continue your Physical Therapy sessions twice/week. Consider going for a walk 1-2 days/week for 10 minutes.        MEAL PLANNING TIPS:  1. Build meals around protein rich foods. Aim for 3-4 ounces (20-30 grams) protein per meal. Lean proteins include chicken, turkey, fish, lean cuts of beef and 90% lean ground beef, pork, shrimp, low fat dairy products, and eggs.   2. Fill half your plate with non-starchy vegetables. Select high fiber starches like  sweet potatoes, peas, beans, lentils, quinoa, whole wheat breads and pastas, and brown rice. Keep portion of starches to 1/4 plate (1/2 cup - 1 cup per meal).  3. Limit snacks to as needed. IF you need a snack, select foods that are high in protein (7-14 grams) and high in fiber (4 grams or more).       Pre-op Goal weight: lose 5% of body weight    Nutrition Monitoring and Evaluation: 1-2 pound weight loss per week  Criteria: weight check  Need for Follow-up:     Patient does meet National Institutes Health guidelines for weight loss surgery, however needs to demonstrate consistent effort in making dietary changes before giving clearance. It is anticipated that the patient will need at least  1-2   nutritional follow-up visits prior to clearance for  surgery.

## 2024-09-24 LAB — UREA BREATH TEST QL: NEGATIVE

## 2024-09-25 LAB
COPPER SERPL-MCNC: 135.8 UG/DL (ref 80–155)
ZINC SERPL-MCNC: 72.7 UG/DL (ref 60–120)

## 2024-09-26 ENCOUNTER — APPOINTMENT (OUTPATIENT)
Dept: SURGERY | Facility: CLINIC | Age: 64
End: 2024-09-26
Payer: COMMERCIAL

## 2024-09-26 PROBLEM — F33.0 MILD EPISODE OF RECURRENT MAJOR DEPRESSIVE DISORDER (CMS-HCC): Status: ACTIVE | Noted: 2024-09-26

## 2024-09-26 LAB — VIT B1 PYROPHOSHATE BLD-SCNC: 137 NMOL/L (ref 70–180)

## 2024-09-28 LAB
COTININE SERPL-MCNC: <5 NG/ML
NICOTINE SERPL-MCNC: <5 NG/ML

## 2024-09-30 ENCOUNTER — DOCUMENTATION (OUTPATIENT)
Dept: BEHAVIORAL HEALTH | Facility: CLINIC | Age: 64
End: 2024-09-30
Payer: COMMERCIAL

## 2024-09-30 NOTE — PROGRESS NOTES
Returned Sarah Takens request to call her regarding her clearance. She was informed about the industry standards for psychologists. She is aware that we will need forms completed from her psychiatrist and therapist in Feb 2025 to help reassess her readiness for clearance from behavioral health.

## 2024-10-22 ENCOUNTER — TELEPHONE (OUTPATIENT)
Dept: SURGERY | Facility: CLINIC | Age: 64
End: 2024-10-22
Payer: COMMERCIAL

## 2024-10-22 NOTE — TELEPHONE ENCOUNTER
Spoke with patient regarding her VM; patient wishing to have surgery in this calendar year to avoid repaying her OOP.  Patient advised I have not received any clearances from her toward her surgery.  Patient asked to have Dr. Rai rethink her determination regarding not getting psych clearance before March 2025 d/t hisptor of hospitalization for mental illness. I explained that even if we has psych clearance along with all other clearances today chances of her getting on the surgery schedule were low because the schedule is almost filled through the end of the year.  Patient verbalized understanding and stated she cannot afford her OOP next year.  I explained I would pend her account and should she change her mind in the future, we can reopen.

## 2024-10-22 NOTE — TELEPHONE ENCOUNTER
Called pt to check in and see how she is doing with meeting her nutrition goals. The pt told this RD that she will not be able to afford surgery if she cannot get it this year d/t insurance purposes. Directed the conversion to FRANTZ Roque. Encouraged the pt to call this RD if she will go through with getting BS.    Danae Kulkarni MS, RD, LD  Phone: [phone]